# Patient Record
Sex: MALE | Race: WHITE
[De-identification: names, ages, dates, MRNs, and addresses within clinical notes are randomized per-mention and may not be internally consistent; named-entity substitution may affect disease eponyms.]

---

## 2016-11-28 VITALS — SYSTOLIC BLOOD PRESSURE: 185 MMHG | DIASTOLIC BLOOD PRESSURE: 97 MMHG

## 2018-04-05 ENCOUNTER — HOSPITAL ENCOUNTER (OUTPATIENT)
Dept: HOSPITAL 24 - RAD | Age: 58
End: 2018-04-05
Attending: INTERNAL MEDICINE
Payer: COMMERCIAL

## 2018-04-05 DIAGNOSIS — M51.16: Primary | ICD-10-CM

## 2018-04-05 PROCEDURE — 72131 CT LUMBAR SPINE W/O DYE: CPT

## 2018-04-05 NOTE — CT
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
ilateral hips



Study: CT lumbar spine without contrast



Comparison: None



Technique:  Multiple axial images of the lumbar spine without the administration of IV contrast.  Sag
ittal and coronal reformats were performed and reviewed.  Dose reduction techniques including Automat
ed Exposure Control (AEC) and adjustment of mA and kV were utilized.



Findings:

There is a spinal stimulator device noted with migrated/malpositioned leads withdrawn into the poster
ior elements at the level of T10-T11.



There are postsurgical changes from left lateral fusion and corpectomy spanning T12 through L2. There
 is a chronic fracture of L1; the left L1 pedicle is also removed. The remaining vertebral bodies pancho
ear intact. The disc spaces are preserved. No acute fracture or abnormal perihardware lucency is iden
tified. Mild multilevel facet degenerative changes are present. No high-grade spinal or foraminal livan
nosis is identified. Incidental note is made of bilateral nephrolithiasis.



IMPRESSION:

1. Spinal stimulator leads have migrated caudally, positioned between the posterior elements at T10-T
11. Correlate clinically.

2. Postsurgical changes spanning T12 through L2 status post lateral body fusion and corpectomy with c
hronic appearing fracture at L1.

3. Incidental note of bilateral nephrolithiasis.



Reported By:Electronically Signed by RICHA CHOE MD at 4/5/2018 2:47:46 PM